# Patient Record
Sex: FEMALE | Race: WHITE | NOT HISPANIC OR LATINO
[De-identification: names, ages, dates, MRNs, and addresses within clinical notes are randomized per-mention and may not be internally consistent; named-entity substitution may affect disease eponyms.]

---

## 2017-03-28 ENCOUNTER — APPOINTMENT (OUTPATIENT)
Dept: OBGYN | Facility: CLINIC | Age: 42
End: 2017-03-28

## 2017-03-28 VITALS — DIASTOLIC BLOOD PRESSURE: 80 MMHG | WEIGHT: 200 LBS | SYSTOLIC BLOOD PRESSURE: 131 MMHG | BODY MASS INDEX: 31.32 KG/M2

## 2017-03-30 LAB
C TRACH RRNA SPEC QL NAA+PROBE: NORMAL
HPV HIGH+LOW RISK DNA PNL CVX: NEGATIVE
N GONORRHOEA RRNA SPEC QL NAA+PROBE: NORMAL
SOURCE TP AMPLIFICATION: NORMAL

## 2017-03-31 LAB — CYTOLOGY CVX/VAG DOC THIN PREP: NORMAL

## 2018-10-31 ENCOUNTER — APPOINTMENT (OUTPATIENT)
Dept: OBGYN | Facility: CLINIC | Age: 43
End: 2018-10-31
Payer: COMMERCIAL

## 2018-10-31 VITALS
BODY MASS INDEX: 32.18 KG/M2 | HEIGHT: 67 IN | WEIGHT: 205 LBS | SYSTOLIC BLOOD PRESSURE: 124 MMHG | DIASTOLIC BLOOD PRESSURE: 76 MMHG

## 2018-10-31 DIAGNOSIS — R92.2 INCONCLUSIVE MAMMOGRAM: ICD-10-CM

## 2018-10-31 PROCEDURE — 99396 PREV VISIT EST AGE 40-64: CPT

## 2018-11-02 LAB — HPV HIGH+LOW RISK DNA PNL CVX: NOT DETECTED

## 2018-11-05 LAB — CYTOLOGY CVX/VAG DOC THIN PREP: NORMAL

## 2019-01-18 ENCOUNTER — APPOINTMENT (OUTPATIENT)
Dept: INTERNAL MEDICINE | Facility: CLINIC | Age: 44
End: 2019-01-18
Payer: COMMERCIAL

## 2019-01-18 VITALS
BODY MASS INDEX: 34.69 KG/M2 | RESPIRATION RATE: 16 BRPM | HEART RATE: 72 BPM | TEMPERATURE: 98 F | HEIGHT: 67 IN | SYSTOLIC BLOOD PRESSURE: 113 MMHG | DIASTOLIC BLOOD PRESSURE: 76 MMHG | WEIGHT: 221 LBS | OXYGEN SATURATION: 100 %

## 2019-01-18 DIAGNOSIS — Z30.09 ENCOUNTER FOR OTHER GENERAL COUNSELING AND ADVICE ON CONTRACEPTION: ICD-10-CM

## 2019-01-18 DIAGNOSIS — R12 HEARTBURN: ICD-10-CM

## 2019-01-18 DIAGNOSIS — Z87.42 PERSONAL HISTORY OF OTHER DISEASES OF THE FEMALE GENITAL TRACT: ICD-10-CM

## 2019-01-18 DIAGNOSIS — Z80.0 FAMILY HISTORY OF MALIGNANT NEOPLASM OF DIGESTIVE ORGANS: ICD-10-CM

## 2019-01-18 DIAGNOSIS — Z83.3 FAMILY HISTORY OF DIABETES MELLITUS: ICD-10-CM

## 2019-01-18 DIAGNOSIS — Z23 ENCOUNTER FOR IMMUNIZATION: ICD-10-CM

## 2019-01-18 DIAGNOSIS — Z34.90 ENCOUNTER FOR SUPERVISION OF NORMAL PREGNANCY, UNSPECIFIED, UNSPECIFIED TRIMESTER: ICD-10-CM

## 2019-01-18 DIAGNOSIS — E66.9 OBESITY, UNSPECIFIED: ICD-10-CM

## 2019-01-18 DIAGNOSIS — Z00.00 ENCOUNTER FOR GENERAL ADULT MEDICAL EXAMINATION W/OUT ABNORMAL FINDINGS: ICD-10-CM

## 2019-01-18 DIAGNOSIS — B96.89 ACUTE VAGINITIS: ICD-10-CM

## 2019-01-18 DIAGNOSIS — N76.0 ACUTE VAGINITIS: ICD-10-CM

## 2019-01-18 PROCEDURE — 90686 IIV4 VACC NO PRSV 0.5 ML IM: CPT

## 2019-01-18 PROCEDURE — 99386 PREV VISIT NEW AGE 40-64: CPT | Mod: 25

## 2019-01-18 PROCEDURE — 90471 IMMUNIZATION ADMIN: CPT

## 2019-01-18 RX ORDER — CHROMIUM 200 MCG
TABLET ORAL
Refills: 0 | Status: ACTIVE | COMMUNITY

## 2019-01-18 RX ORDER — ESOMEPRAZOLE MAGNESIUM 20 MG/1
20 CAPSULE, DELAYED RELEASE ORAL
Refills: 0 | Status: ACTIVE | COMMUNITY

## 2019-01-23 LAB
25(OH)D3 SERPL-MCNC: 35.1 NG/ML
ALBUMIN SERPL ELPH-MCNC: 4.2 G/DL
ALP BLD-CCNC: 61 U/L
ALT SERPL-CCNC: 13 U/L
ANION GAP SERPL CALC-SCNC: 13 MMOL/L
AST SERPL-CCNC: 15 U/L
BASOPHILS # BLD AUTO: 0.03 K/UL
BASOPHILS NFR BLD AUTO: 0.4 %
BILIRUB SERPL-MCNC: 0.6 MG/DL
BUN SERPL-MCNC: 10 MG/DL
CALCIUM SERPL-MCNC: 9.2 MG/DL
CHLORIDE SERPL-SCNC: 101 MMOL/L
CHOLEST SERPL-MCNC: 159 MG/DL
CHOLEST/HDLC SERPL: 3.2 RATIO
CO2 SERPL-SCNC: 25 MMOL/L
CREAT SERPL-MCNC: 0.68 MG/DL
EOSINOPHIL # BLD AUTO: 0.12 K/UL
EOSINOPHIL NFR BLD AUTO: 1.6 %
GLUCOSE SERPL-MCNC: 85 MG/DL
HBA1C MFR BLD HPLC: 5.4 %
HCT VFR BLD CALC: 42.9 %
HDLC SERPL-MCNC: 50 MG/DL
HGB BLD-MCNC: 13.4 G/DL
IMM GRANULOCYTES NFR BLD AUTO: 0.1 %
LDLC SERPL CALC-MCNC: 97 MG/DL
LYMPHOCYTES # BLD AUTO: 2.77 K/UL
LYMPHOCYTES NFR BLD AUTO: 37.6 %
MAN DIFF?: NORMAL
MCHC RBC-ENTMCNC: 30.5 PG
MCHC RBC-ENTMCNC: 31.2 GM/DL
MCV RBC AUTO: 97.7 FL
MONOCYTES # BLD AUTO: 0.35 K/UL
MONOCYTES NFR BLD AUTO: 4.7 %
NEUTROPHILS # BLD AUTO: 4.09 K/UL
NEUTROPHILS NFR BLD AUTO: 55.6 %
PLATELET # BLD AUTO: 248 K/UL
POTASSIUM SERPL-SCNC: 3.8 MMOL/L
PROT SERPL-MCNC: 6.8 G/DL
RBC # BLD: 4.39 M/UL
RBC # FLD: 12.8 %
SODIUM SERPL-SCNC: 139 MMOL/L
T4 FREE SERPL-MCNC: 1.1 NG/DL
TRIGL SERPL-MCNC: 59 MG/DL
TSH SERPL-ACNC: 1.58 UIU/ML
WBC # FLD AUTO: 7.37 K/UL

## 2020-03-02 ENCOUNTER — TRANSCRIPTION ENCOUNTER (OUTPATIENT)
Age: 45
End: 2020-03-02

## 2020-03-20 ENCOUNTER — APPOINTMENT (OUTPATIENT)
Dept: OTOLARYNGOLOGY | Facility: CLINIC | Age: 45
End: 2020-03-20
Payer: COMMERCIAL

## 2020-03-20 VITALS
SYSTOLIC BLOOD PRESSURE: 108 MMHG | HEART RATE: 69 BPM | HEIGHT: 67 IN | BODY MASS INDEX: 34.21 KG/M2 | WEIGHT: 218 LBS | DIASTOLIC BLOOD PRESSURE: 76 MMHG

## 2020-03-20 DIAGNOSIS — H81.91 UNSPECIFIED DISORDER OF VESTIBULAR FUNCTION, RIGHT EAR: ICD-10-CM

## 2020-03-20 DIAGNOSIS — H90.3 SENSORINEURAL HEARING LOSS, BILATERAL: ICD-10-CM

## 2020-03-20 DIAGNOSIS — R42 DIZZINESS AND GIDDINESS: ICD-10-CM

## 2020-03-20 DIAGNOSIS — H93.13 TINNITUS, BILATERAL: ICD-10-CM

## 2020-03-20 PROCEDURE — 92557 COMPREHENSIVE HEARING TEST: CPT

## 2020-03-20 PROCEDURE — 92567 TYMPANOMETRY: CPT

## 2020-03-20 PROCEDURE — 99204 OFFICE O/P NEW MOD 45 MIN: CPT | Mod: 25

## 2020-03-20 NOTE — PHYSICAL EXAM
[Midline] : trachea located in midline position [Normal] : the left mastoid was normal [Hearing Loss Right Only] : normal [Hearing Loss Left Only] : normal [Nystagmus] : ~T no ~M nystagmus was seen [Fukuda Step Test] : Fukuda Step Test was Negative [[place]] : [place]pike: Positive [de-identified] : Dizzy w/o nystagmus right ear down w/o delay or fatigue

## 2020-03-20 NOTE — ASSESSMENT
[FreeTextEntry1] : Vertigo: right sided weakness r/o [place] vs cervical vertigo\par -Hearing Test performed to evaluate the extent of hearing loss and to explain pt's symptoms\par essentially wnl bilat\par -Low sodium diet reviewed \par -ABR/VNG/ECoG/VEMP testing \par -Vestibular rehab \par consider acupuncture\par \par f/u 1 month or prn \par

## 2020-03-20 NOTE — HISTORY OF PRESENT ILLNESS
[Tinnitus] : tinnitus [Vertigo] : vertigo [Dizziness] : dizziness [No] : patient does not have a  history of radiation therapy [de-identified] : 43 yo female\par Patient complains of vertigo x 2 months. Had vertigo when she was in her 20, was cleared for 12 years. 2 months ago she started getting dizzy again. She was laying down in bed when she first noticed it. Worse when she puts her down and sits with her neck in a bending position for to long, worse on her right side, dizziness last for 2 minutes. Sitting and standing in straight position makes it better. Also has a ocean like sound in both ears. No hearing loss. Denies high sodium diet. Denies recent URI.\par Pt has no ear pain, ear drainage, hearing loss, nasal congestion, nasal discharge, epistaxis, sinus infections, facial pain, facial pressure, throat pain, dysphagia or fevers.\par \par  [Anxiety] : no anxiety [Headache] : no headache [Hearing Loss] : no hearing loss [Recurrent Otitis Media] : no recurrent otitis media [Otitis Media with Effusion] : no otitis media with effusion [Presbycusis] : no presbycusis [Congenital Ear Malformation] : no congenital ear malformation [Meniere Disease] : no Meniere disease [Otosclerosis] : no otosclerosis [Perilymphatic Fistula] : no perilymphatic fistula [Hypertension] : no hypertension [Loud Noise Exposure] : no history of loud noise exposure [Smoking] : no smoking [Early Onset Hearing Loss] : no early onset hearing loss [Stroke] : no stroke [Facial Pain] : no facial pain [Facial Pressure] : no facial pressure [Nasal Congestion] : no nasal congestion [Diplopia] : no diplopia [Ear Fullness] : no ear fullness [Allergic Rhinitis] : no allergic rhinitis [Maxillary Tooth Infection] : no maxillary tooth infection [Septal Deviation] : no septal deviation [Environmental Allergies] : no environmental allergies [Seasonal Allergies] : no seasonal allergies [Environmental Allergens] : no environmental allergens [Adenoidectomy] : no adenoidectomy [Nasal FB Removal] : no nasal foreign body removal [Allergies] : no allergies [Asthma] : no asthma [Neck Mass] : no neck mass [Neck Pain] : no neck pain [Chills] : no chills [Cold Intolerance] : no cold intolerance [Cough] : no cough [Fatigue] : no fatigue [Heat Intolerance] : no heat intolerance [Hyperthyroidism] : no hyperthyroidism [Sialadenitis] : no sialadenitis [Hodgkin Disease] : no hodgkin disease [Non-Hodgkin Lymphoma] : no non-hodgkin lymphoma [Tobacco Use] : no tobacco use [Alcohol Use] : no alcohol use [Graves Disease] : no graves disease [Thyroid Cancer] : no thyroid cancer

## 2020-04-23 ENCOUNTER — APPOINTMENT (OUTPATIENT)
Dept: OBGYN | Facility: CLINIC | Age: 45
End: 2020-04-23

## 2020-05-22 ENCOUNTER — APPOINTMENT (OUTPATIENT)
Dept: OTOLARYNGOLOGY | Facility: CLINIC | Age: 45
End: 2020-05-22

## 2020-06-11 ENCOUNTER — APPOINTMENT (OUTPATIENT)
Dept: OBGYN | Facility: CLINIC | Age: 45
End: 2020-06-11
Payer: COMMERCIAL

## 2020-06-11 VITALS
WEIGHT: 215 LBS | SYSTOLIC BLOOD PRESSURE: 115 MMHG | BODY MASS INDEX: 33.74 KG/M2 | DIASTOLIC BLOOD PRESSURE: 70 MMHG | HEIGHT: 67 IN

## 2020-06-11 PROCEDURE — 99396 PREV VISIT EST AGE 40-64: CPT

## 2020-06-11 NOTE — PHYSICAL EXAM
[Acute Distress] : no acute distress [Awake] : awake [Alert] : alert [Nipple Discharge] : no nipple discharge [Mass] : no breast mass [Soft] : soft [Tender] : non tender [Axillary LAD] : no axillary lymphadenopathy [Oriented x3] : oriented to person, place, and time [Normal] : uterus [Uterine Adnexae] : were not tender and not enlarged [No Bleeding] : there was no active vaginal bleeding

## 2020-06-11 NOTE — HISTORY OF PRESENT ILLNESS
[1 Year Ago] : 1 year ago [Good] : being in good health [Healthy Diet] : a healthy diet [Regular Exercise] : regular exercise [Menstrual Problems] : reports normal menses [Weight Concerns] : no concerns with her weight [Up to Date] : up to date with ~his/her~ STD screening [Nausea] : no nausea [Fever] : no fever [Vomiting] : no vomiting [Diarrhea] : no diarrhea [Vaginal Bleeding] : no vaginal bleeding [Pelvic Pressure] : no pelvic pressure [Dysuria] : no dysuria [Sexually Active] : is sexually active

## 2020-06-15 LAB
CYTOLOGY CVX/VAG DOC THIN PREP: ABNORMAL
HPV HIGH+LOW RISK DNA PNL CVX: NOT DETECTED

## 2021-08-01 ENCOUNTER — TRANSCRIPTION ENCOUNTER (OUTPATIENT)
Age: 46
End: 2021-08-01

## 2021-08-27 ENCOUNTER — RESULT REVIEW (OUTPATIENT)
Age: 46
End: 2021-08-27

## 2021-11-16 ENCOUNTER — APPOINTMENT (OUTPATIENT)
Dept: OBGYN | Facility: CLINIC | Age: 46
End: 2021-11-16
Payer: COMMERCIAL

## 2021-11-16 ENCOUNTER — NON-APPOINTMENT (OUTPATIENT)
Age: 46
End: 2021-11-16

## 2021-11-16 VITALS
HEIGHT: 67 IN | WEIGHT: 220 LBS | BODY MASS INDEX: 34.53 KG/M2 | SYSTOLIC BLOOD PRESSURE: 133 MMHG | DIASTOLIC BLOOD PRESSURE: 81 MMHG

## 2021-11-16 PROCEDURE — 99396 PREV VISIT EST AGE 40-64: CPT

## 2021-11-18 LAB — HPV HIGH+LOW RISK DNA PNL CVX: NOT DETECTED

## 2021-11-22 LAB — CYTOLOGY CVX/VAG DOC THIN PREP: ABNORMAL

## 2022-06-28 ENCOUNTER — OFFICE (OUTPATIENT)
Dept: URBAN - METROPOLITAN AREA CLINIC 77 | Facility: CLINIC | Age: 47
Setting detail: OPHTHALMOLOGY
End: 2022-06-28
Payer: COMMERCIAL

## 2022-06-28 DIAGNOSIS — H40.013: ICD-10-CM

## 2022-06-28 DIAGNOSIS — H16.223: ICD-10-CM

## 2022-06-28 PROCEDURE — 92004 COMPRE OPH EXAM NEW PT 1/>: CPT | Performed by: STUDENT IN AN ORGANIZED HEALTH CARE EDUCATION/TRAINING PROGRAM

## 2022-06-28 PROCEDURE — 92133 CPTRZD OPH DX IMG PST SGM ON: CPT | Performed by: STUDENT IN AN ORGANIZED HEALTH CARE EDUCATION/TRAINING PROGRAM

## 2022-06-28 PROCEDURE — 92083 EXTENDED VISUAL FIELD XM: CPT | Performed by: STUDENT IN AN ORGANIZED HEALTH CARE EDUCATION/TRAINING PROGRAM

## 2022-06-28 ASSESSMENT — REFRACTION_MANIFEST
OS_SPHERE: PLANO
OU_VA: 20/0
OS_VA1: 20/20
OD_SPHERE: PLANO
OD_VA1: 20/20

## 2022-06-28 ASSESSMENT — SPHEQUIV_DERIVED
OD_SPHEQUIV: -0.375
OS_SPHEQUIV: -0.625

## 2022-06-28 ASSESSMENT — REFRACTION_AUTOREFRACTION
OS_SPHERE: -0.50
OS_CYLINDER: -0.25
OD_CYLINDER: -0.25
OD_SPHERE: -0.25
OD_AXIS: 33
OS_AXIS: 44

## 2022-06-28 ASSESSMENT — TONOMETRY
OD_IOP_MMHG: 15
OS_IOP_MMHG: 18

## 2022-06-28 ASSESSMENT — KERATOMETRY
OD_K1POWER_DIOPTERS: 43.50
OD_AXISANGLE_DEGREES: 17
OD_K2POWER_DIOPTERS: 44.25
OS_AXISANGLE_DEGREES: 113
OS_K2POWER_DIOPTERS: 45.75
OS_K1POWER_DIOPTERS: 44.50

## 2022-06-28 ASSESSMENT — CONFRONTATIONAL VISUAL FIELD TEST (CVF)
OS_FINDINGS: FULL
OD_FINDINGS: FULL

## 2022-06-28 ASSESSMENT — SUPERFICIAL PUNCTATE KERATITIS (SPK)
OD_SPK: T
OS_SPK: T

## 2022-06-28 ASSESSMENT — AXIALLENGTH_DERIVED
OD_AL: 23.6003
OS_AL: 23.2437

## 2022-06-28 ASSESSMENT — VISUAL ACUITY
OD_BCVA: 20/20
OS_BCVA: 20/20

## 2023-04-25 ENCOUNTER — APPOINTMENT (OUTPATIENT)
Dept: OBGYN | Facility: CLINIC | Age: 48
End: 2023-04-25

## 2023-05-02 ENCOUNTER — OFFICE (OUTPATIENT)
Dept: URBAN - METROPOLITAN AREA CLINIC 77 | Facility: CLINIC | Age: 48
Setting detail: OPHTHALMOLOGY
End: 2023-05-02
Payer: COMMERCIAL

## 2023-05-02 DIAGNOSIS — H16.223: ICD-10-CM

## 2023-05-02 DIAGNOSIS — H40.013: ICD-10-CM

## 2023-05-02 PROCEDURE — 76514 ECHO EXAM OF EYE THICKNESS: CPT | Performed by: OPHTHALMOLOGY

## 2023-05-02 PROCEDURE — 92012 INTRM OPH EXAM EST PATIENT: CPT | Performed by: OPHTHALMOLOGY

## 2023-05-02 PROCEDURE — 92133 CPTRZD OPH DX IMG PST SGM ON: CPT | Performed by: OPHTHALMOLOGY

## 2023-05-02 ASSESSMENT — REFRACTION_MANIFEST
OD_VA1: 20/20
OS_ADD: +1.50
OD_ADD: +1.50
OS_SPHERE: PLANO
OD_SPHERE: PLANO
OD_SPHERE: PLANO
OS_VA1: 20/20
OU_VA: 20/0
OS_VA1: 20/20
OS_SPHERE: -0.50
OD_VA1: 20/20

## 2023-05-02 ASSESSMENT — KERATOMETRY
OS_K1POWER_DIOPTERS: 44.75
OD_K2POWER_DIOPTERS: 44.75
OS_AXISANGLE_DEGREES: 092
OD_AXISANGLE_DEGREES: 070
OD_K1POWER_DIOPTERS: 44.00
OS_K2POWER_DIOPTERS: 45.50

## 2023-05-02 ASSESSMENT — REFRACTION_AUTOREFRACTION
OD_SPHERE: PLANO
OS_SPHERE: -0.50
OD_CYLINDER: -0.25
OD_AXIS: 007
OS_CYLINDER: -0.50
OS_AXIS: 14

## 2023-05-02 ASSESSMENT — VISUAL ACUITY
OD_BCVA: 20/25
OS_BCVA: 20/20

## 2023-05-02 ASSESSMENT — SPHEQUIV_DERIVED: OS_SPHEQUIV: -0.75

## 2023-05-02 ASSESSMENT — AXIALLENGTH_DERIVED: OS_AL: 23.2911

## 2023-05-02 ASSESSMENT — PACHYMETRY
OS_CT_UM: 500
OD_CT_CORRECTION: 4
OD_CT_UM: 498
OS_CT_CORRECTION: 3

## 2023-05-02 ASSESSMENT — CONFRONTATIONAL VISUAL FIELD TEST (CVF)
OD_FINDINGS: FULL
OS_FINDINGS: FULL

## 2023-05-02 ASSESSMENT — LID EXAM ASSESSMENTS
OD_BLEPHARITIS: ABSENT
OD_FRONTALIS_USE: ABSENT
OS_FRONTALIS_USE: ABSENT
OS_BLEPHARITIS: ABSENT

## 2023-05-02 ASSESSMENT — TONOMETRY
OS_IOP_MMHG: 12
OD_IOP_MMHG: 10

## 2023-05-02 ASSESSMENT — SUPERFICIAL PUNCTATE KERATITIS (SPK)
OS_SPK: T
OD_SPK: T

## 2023-09-19 ENCOUNTER — APPOINTMENT (OUTPATIENT)
Dept: OBGYN | Facility: CLINIC | Age: 48
End: 2023-09-19

## 2023-11-14 ENCOUNTER — APPOINTMENT (OUTPATIENT)
Dept: OBGYN | Facility: CLINIC | Age: 48
End: 2023-11-14
Payer: COMMERCIAL

## 2023-11-14 VITALS
HEIGHT: 67 IN | SYSTOLIC BLOOD PRESSURE: 127 MMHG | WEIGHT: 225 LBS | BODY MASS INDEX: 35.31 KG/M2 | DIASTOLIC BLOOD PRESSURE: 85 MMHG

## 2023-11-14 DIAGNOSIS — Z01.419 ENCOUNTER FOR GYNECOLOGICAL EXAMINATION (GENERAL) (ROUTINE) W/OUT ABNORMAL FINDINGS: ICD-10-CM

## 2023-11-14 PROCEDURE — 99396 PREV VISIT EST AGE 40-64: CPT

## 2023-11-15 LAB — HPV HIGH+LOW RISK DNA PNL CVX: NOT DETECTED

## 2023-11-19 LAB — CYTOLOGY CVX/VAG DOC THIN PREP: NORMAL

## 2023-11-28 ENCOUNTER — TRANSCRIPTION ENCOUNTER (OUTPATIENT)
Age: 48
End: 2023-11-28

## 2023-11-28 ENCOUNTER — APPOINTMENT (OUTPATIENT)
Dept: OBGYN | Facility: CLINIC | Age: 48
End: 2023-11-28
Payer: COMMERCIAL

## 2023-11-28 DIAGNOSIS — Z30.430 ENCOUNTER FOR INSERTION OF INTRAUTERINE CONTRACEPTIVE DEVICE: ICD-10-CM

## 2023-11-28 DIAGNOSIS — Z30.432 ENCOUNTER FOR REMOVAL OF INTRAUTERINE CONTRACEPTIVE DEVICE: ICD-10-CM

## 2023-11-28 PROCEDURE — 58300 INSERT INTRAUTERINE DEVICE: CPT

## 2023-11-28 PROCEDURE — 58301 REMOVE INTRAUTERINE DEVICE: CPT

## 2024-07-19 ENCOUNTER — NON-APPOINTMENT (OUTPATIENT)
Age: 49
End: 2024-07-19

## 2024-07-19 ENCOUNTER — APPOINTMENT (OUTPATIENT)
Dept: OTOLARYNGOLOGY | Facility: CLINIC | Age: 49
End: 2024-07-19
Payer: COMMERCIAL

## 2024-07-19 VITALS
BODY MASS INDEX: 34.53 KG/M2 | HEIGHT: 67 IN | HEART RATE: 66 BPM | WEIGHT: 220 LBS | SYSTOLIC BLOOD PRESSURE: 106 MMHG | TEMPERATURE: 98 F | DIASTOLIC BLOOD PRESSURE: 73 MMHG

## 2024-07-19 DIAGNOSIS — H81.91 UNSPECIFIED DISORDER OF VESTIBULAR FUNCTION, RIGHT EAR: ICD-10-CM

## 2024-07-19 DIAGNOSIS — H90.3 SENSORINEURAL HEARING LOSS, BILATERAL: ICD-10-CM

## 2024-07-19 DIAGNOSIS — M26.609 UNSPECIFIED TEMPOROMANDIBULAR JOINT DISORDER: ICD-10-CM

## 2024-07-19 DIAGNOSIS — H93.13 TINNITUS, BILATERAL: ICD-10-CM

## 2024-07-19 PROCEDURE — 92567 TYMPANOMETRY: CPT

## 2024-07-19 PROCEDURE — 99203 OFFICE O/P NEW LOW 30 MIN: CPT | Mod: 25

## 2024-07-19 PROCEDURE — 92557 COMPREHENSIVE HEARING TEST: CPT

## 2025-03-17 ENCOUNTER — APPOINTMENT (OUTPATIENT)
Facility: CLINIC | Age: 50
End: 2025-03-17
Payer: COMMERCIAL

## 2025-03-17 VITALS
HEIGHT: 67 IN | BODY MASS INDEX: 34.53 KG/M2 | DIASTOLIC BLOOD PRESSURE: 68 MMHG | WEIGHT: 220 LBS | SYSTOLIC BLOOD PRESSURE: 116 MMHG

## 2025-03-17 DIAGNOSIS — Z01.419 ENCOUNTER FOR GYNECOLOGICAL EXAMINATION (GENERAL) (ROUTINE) W/OUT ABNORMAL FINDINGS: ICD-10-CM

## 2025-03-17 DIAGNOSIS — N60.19 DIFFUSE CYSTIC MASTOPATHY OF UNSPECIFIED BREAST: ICD-10-CM

## 2025-03-17 PROCEDURE — 99396 PREV VISIT EST AGE 40-64: CPT

## 2025-03-19 LAB — HPV HIGH+LOW RISK DNA PNL CVX: NOT DETECTED

## 2025-03-24 LAB — CYTOLOGY CVX/VAG DOC THIN PREP: NORMAL

## 2025-06-13 ENCOUNTER — NON-APPOINTMENT (OUTPATIENT)
Age: 50
End: 2025-06-13